# Patient Record
Sex: FEMALE | Race: WHITE
[De-identification: names, ages, dates, MRNs, and addresses within clinical notes are randomized per-mention and may not be internally consistent; named-entity substitution may affect disease eponyms.]

---

## 2018-06-22 ENCOUNTER — HOSPITAL ENCOUNTER (EMERGENCY)
Dept: HOSPITAL 80 - FED | Age: 58
Discharge: HOME | End: 2018-06-22
Payer: COMMERCIAL

## 2018-06-22 VITALS — SYSTOLIC BLOOD PRESSURE: 182 MMHG | DIASTOLIC BLOOD PRESSURE: 111 MMHG

## 2018-06-22 DIAGNOSIS — M66.0: Primary | ICD-10-CM

## 2018-06-22 NOTE — EDPHY
HPI/HX/ROS/PE/MDM


Narrative: 





CHIEF COMPLAINT: Left leg pain and swelling





HPI: The patient is a 58 y/o female arriving with her  complaining of 

left leg pain and swelling for the last 3 weeks. A few weeks ago she noticed "

my kneecap was marvin pushed over and I had knee pain" on the anterior aspect. 

She cannot identify an obvious precipitating event. She was able to compensate 

for the pain and limp around with some discomfort. On 6/12, 10 days ago, she 

went to visit her mother in [place] and was on her feet most of the time while 

there, which seemed to aggravate symptoms. Upon return home two days ago, the 

pain seemed worse and she was unsure how to manage it. She has tried rest, ice 

packs, and warm baths without alleviation. Last night she took a hot bath, 

which seemed to aggravate her pain. She thinks the swelling has gone down in 

the last few hours. She denies chest pain, dyspnea, fever, or other complaints. 

She is normally healthy. 





REVIEW OF SYSTEMS:


Aside from elements discussed in the HPI, a comprehensive 10-point review of 

systems was reviewed and is negative.





PMH: Denies





SOCIAL HISTORY:  at bedside. Lives in [place]. Not employed.





PHYSICAL EXAM:


General:Patient is alert, in no acute distress.


ENT:Eyes are normal to inspection.  ENT inspection normal.


Neck: Normal inspection.  Full range of motion.


Respiratory:No respiratory distress.  Breath sounds normal bilaterally.


Cardiovascular: Regular rate and rhythm.  Strong peripheral pulses.  Normal cap 

refill.


Abdomen:The abdomen is nontender to palpation. There are no peritoneal signs. 


Back: Normal to inspection.  No tenderness to palpation.


Skin: Normal color.  No rash.  Warm and dry.


Extremities: Normal appearance.  Full range of motion.


Neuro: Oriented x3.  Normal motor function.  Normal sensory function.


ED Course: 


This is a healthy 58 y/o female who presents with a 3-week history of left knee 

pain and swelling along the posterior aspect of her knee. No symptoms 

concerning for PE and no prior history of blood clots. Exam is unremarkable. 

Plan for US to rule out DVT. 





US of left leg: ruptured Baker's cyst, negative for DVT.





Discussed results with patient. She will be discharged home with standard Baker'

s cyst care instructions and referral to ortho for follow up. Return 

precautions discussed. She is comfortable with this plan. 





- Data Points


Imaging Results: 


 Imaging Impressions





Extremity Venous Study  06/22/18 13:29


Impression:  


1. No deep venous thrombosis left leg.


2. Left popliteal fossa ruptured Baker's cyst.


 


 


Findings and recommendations discussed with Emergency Department physician, 

Leoncio Trivedi MD  at  14:06 hour, 6/22/2018.


 


Final report concurs with initial preliminary interpretation.











Imaging: Discussed imaging studies w/ On call Radiologist





General


Time Seen by Provider: 06/22/18 13:29


Initial Vital Signs: 


 Initial Vital Signs











Temperature (C)  36.5 C   06/22/18 12:46


 


Heart Rate  83   06/22/18 12:46


 


Respiratory Rate  17   06/22/18 12:46


 


Blood Pressure  169/106 H  06/22/18 12:46


 


O2 Sat (%)  96   06/22/18 12:46








 











O2 Delivery Mode               Room Air














Allergies/Adverse Reactions: 


 





amoxicillin Allergy (Verified 06/22/18 12:45)


 


cephalexin [From Keflex] Allergy (Verified 06/22/18 12:46)


 








Home Medications: 














 Medication  Instructions  Recorded


 


NK [No Known Home Meds]  06/22/18














Departure





- Departure


Disposition: Home, Routine, Self-Care


Clinical Impression: 


 Ruptured Bakers cyst





Condition: Good


Instructions:  Bakers Cyst (ED)


Additional Instructions: 


Follow up with orthopedist in the next 2-3 days for reevaluation. I recommend 

calling today to schedule this appointment.





Activity as tolerated.





Take 600mg ibuprofen every 8 hours for pain and inflammation over the next 

couple days. 





Return to the ED for severe pain, shortness of breath, chest pain, or other 

worsening of condition.


Referrals: 


Audi Patten MD [Medical Doctor] - As per Instructions


Report Scribed for: Leoncio Trivedi


Report Scribed by: Zenobia Horner


Date of Report: 06/22/18


Time of Report: 13:56


Physician Review and Approval Statement: Portions of this note were transcribed 

by an ED scribe.  I personally performed the history, physical exam, and 

medical decision making; and confirm the accuracy of the information in the 

transcribed note.